# Patient Record
Sex: MALE | Race: BLACK OR AFRICAN AMERICAN | NOT HISPANIC OR LATINO | URBAN - METROPOLITAN AREA
[De-identification: names, ages, dates, MRNs, and addresses within clinical notes are randomized per-mention and may not be internally consistent; named-entity substitution may affect disease eponyms.]

---

## 2017-06-02 ENCOUNTER — EMERGENCY (EMERGENCY)
Facility: HOSPITAL | Age: 22
LOS: 1 days | Discharge: HOME | End: 2017-06-02
Attending: EMERGENCY MEDICINE | Admitting: EMERGENCY MEDICINE
Payer: COMMERCIAL

## 2017-06-02 VITALS
DIASTOLIC BLOOD PRESSURE: 87 MMHG | HEART RATE: 79 BPM | OXYGEN SATURATION: 98 % | SYSTOLIC BLOOD PRESSURE: 128 MMHG | TEMPERATURE: 98 F | RESPIRATION RATE: 16 BRPM

## 2017-06-02 DIAGNOSIS — R07.89 OTHER CHEST PAIN: ICD-10-CM

## 2017-06-02 PROCEDURE — 93005 ELECTROCARDIOGRAM TRACING: CPT

## 2017-06-02 PROCEDURE — 71046 X-RAY EXAM CHEST 2 VIEWS: CPT

## 2017-06-02 PROCEDURE — 99284 EMERGENCY DEPT VISIT MOD MDM: CPT | Mod: 25

## 2017-06-02 PROCEDURE — 93010 ELECTROCARDIOGRAM REPORT: CPT

## 2017-06-02 PROCEDURE — 71020: CPT | Mod: 26

## 2017-06-02 PROCEDURE — 99283 EMERGENCY DEPT VISIT LOW MDM: CPT | Mod: 25

## 2017-06-02 PROCEDURE — 96372 THER/PROPH/DIAG INJ SC/IM: CPT

## 2017-06-02 RX ORDER — KETOROLAC TROMETHAMINE 30 MG/ML
30 SYRINGE (ML) INJECTION ONCE
Qty: 0 | Refills: 0 | Status: DISCONTINUED | OUTPATIENT
Start: 2017-06-02 | End: 2017-06-02

## 2017-06-02 RX ADMIN — Medication 30 MILLIGRAM(S): at 05:49

## 2017-06-02 NOTE — ED PROVIDER NOTE - ATTENDING CONTRIBUTION TO CARE
22M no PMH c/o L sided chest pain. pt states chest pain started when he became upset that he did not have a metrocard earlier. then states pain worsened when got into argument with boss.  no SOB. no fevers. no recent travel. no family hx of sudden cardiac death.  gen- nad  heent- ncat, clear conj  cv -rrr  lungs -ctab  abd - soft, nt, nd  ext -wwp, no edema  neuro -aox3, steady gait, mata  no acute st/t wave changes on EKG, no infiltrate on CXR. doubt ACS, no cardiac risk factors, doubt PE, no tachycardia, no tachypnea, no hypoxia, recommend PMD f/u

## 2017-06-02 NOTE — ED PROVIDER NOTE - MEDICAL DECISION MAKING DETAILS
counseled on care and follow up EKG, CXR reviewed + analgesics given Doubt cardiac etiology and no overt pulmonary process noted- imp musculoskeletal with stress exacerbation

## 2017-06-02 NOTE — ED ADULT TRIAGE NOTE - CHIEF COMPLAINT QUOTE
pt BIBA from home with left sided CP radiating down the left arm with nausea no vomiting starting at 330am while getting ready for work. Denies SOB. Received 162 ASA by ems

## 2017-06-02 NOTE — ED PROVIDER NOTE - OBJECTIVE STATEMENT
chest pain this AM noted by patient on left side with radiation to Arm Pain exacerbated when [place] [employer] and had no money or card and got anxious and sneaking in and then even further exacerbated when arriving at work and telling boss who was not supportive ans thus called EMS to arrive at ED -admit to having pain in pasty but " ignored it" takes no meds smokes marijuana at times not in last few days and no cardiac Hx or FH

## 2017-06-02 NOTE — ED ADULT NURSE NOTE - OBJECTIVE STATEMENT
pt received in room 2 , pt c/o Left sided chest pain that radiates to his left arm since last night , pt denies any SOB , no dizziness